# Patient Record
Sex: FEMALE | Race: WHITE | NOT HISPANIC OR LATINO | Employment: FULL TIME | URBAN - METROPOLITAN AREA
[De-identification: names, ages, dates, MRNs, and addresses within clinical notes are randomized per-mention and may not be internally consistent; named-entity substitution may affect disease eponyms.]

---

## 2017-07-12 ENCOUNTER — APPOINTMENT (OUTPATIENT)
Dept: WOMENS IMAGING | Facility: HOSPITAL | Age: 62
End: 2017-07-12

## 2017-07-12 PROCEDURE — 77067 SCR MAMMO BI INCL CAD: CPT | Performed by: RADIOLOGY

## 2017-07-12 PROCEDURE — 77063 BREAST TOMOSYNTHESIS BI: CPT | Performed by: RADIOLOGY

## 2017-08-14 ENCOUNTER — OFFICE VISIT (OUTPATIENT)
Dept: NEUROSURGERY | Facility: CLINIC | Age: 62
End: 2017-08-14

## 2017-08-14 VITALS
WEIGHT: 180 LBS | HEIGHT: 63 IN | TEMPERATURE: 97.4 F | DIASTOLIC BLOOD PRESSURE: 80 MMHG | SYSTOLIC BLOOD PRESSURE: 152 MMHG | BODY MASS INDEX: 31.89 KG/M2

## 2017-08-14 DIAGNOSIS — M51.36 DEGENERATIVE DISC DISEASE, LUMBAR: Primary | ICD-10-CM

## 2017-08-14 DIAGNOSIS — G89.29 CHRONIC PAIN OF LEFT KNEE: ICD-10-CM

## 2017-08-14 DIAGNOSIS — M25.562 CHRONIC PAIN OF LEFT KNEE: ICD-10-CM

## 2017-08-14 PROCEDURE — 99203 OFFICE O/P NEW LOW 30 MIN: CPT | Performed by: NEUROLOGICAL SURGERY

## 2017-08-14 RX ORDER — ATORVASTATIN CALCIUM 20 MG/1
20 TABLET, FILM COATED ORAL DAILY
COMMUNITY

## 2017-08-14 RX ORDER — IBUPROFEN 600 MG/1
600 TABLET ORAL EVERY 6 HOURS PRN
COMMUNITY

## 2017-08-14 RX ORDER — PHENOL 1.4 %
600 AEROSOL, SPRAY (ML) MUCOUS MEMBRANE DAILY
COMMUNITY

## 2017-08-14 RX ORDER — GABAPENTIN 300 MG/1
300 CAPSULE ORAL NIGHTLY
COMMUNITY

## 2017-08-14 RX ORDER — LOSARTAN POTASSIUM AND HYDROCHLOROTHIAZIDE 25; 100 MG/1; MG/1
1 TABLET ORAL DAILY
COMMUNITY

## 2017-08-14 RX ORDER — LEVOTHYROXINE SODIUM 88 UG/1
88 TABLET ORAL DAILY
COMMUNITY

## 2017-08-14 RX ORDER — PROMETHAZINE HYDROCHLORIDE 12.5 MG/1
12.5 TABLET ORAL EVERY 6 HOURS PRN
COMMUNITY

## 2017-08-14 RX ORDER — OMEPRAZOLE 20 MG/1
20 CAPSULE, DELAYED RELEASE ORAL DAILY
COMMUNITY

## 2017-08-14 RX ORDER — MONTELUKAST SODIUM 10 MG/1
10 TABLET ORAL NIGHTLY
COMMUNITY

## 2017-08-14 NOTE — PROGRESS NOTES
Amelia Patricio  1955  [id number]      Chief Complaint   Patient presents with   • Back Pain   • Leg Pain   • Numbness       HISTORY OF PRESENT ILLNESS:  This is a 62-year-old female who presents with a chief complaint of pain in her lumbosacral area associated with numbness in the left anterior thigh and subsequent below the knee as well as pain and swelling in her left knee which she attributes to an accident she sustained 1-19-17.  She went to physical therapy which helps somewhat yet she remains symptomatic.  She has continued to work.  I'll do with prolonged sitting and standing.  MRI of the lumbar spine and her left knee have been performed.  She is referred for neurosurgical consultation.     Past Medical History:   Diagnosis Date   • Arthritis    • DM (diabetes mellitus)    • Hernia    • Hypertension    • Thyroid disease        Past Surgical History:   Procedure Laterality Date   • BILATERAL BREAST REDUCTION     • TONSILLECTOMY         Family History   Problem Relation Age of Onset   • Cancer Mother    • Heart disease Father        Social History     Social History   • Marital status:      Spouse name: N/A   • Number of children: N/A   • Years of education: N/A     Occupational History   • Not on file.     Social History Main Topics   • Smoking status: Never Smoker   • Smokeless tobacco: Not on file   • Alcohol use No   • Drug use: No   • Sexual activity: Defer     Other Topics Concern   • Not on file     Social History Narrative   • No narrative on file       Allergies   Allergen Reactions   • Augmentin [Amoxicillin-Pot Clavulanate]          Current Outpatient Prescriptions:   •  atorvastatin (LIPITOR) 20 MG tablet, Take 20 mg by mouth Daily., Disp: , Rfl:   •  calcium carbonate (OS-TRUMAN) 600 MG tablet, Take 600 mg by mouth Daily., Disp: , Rfl:   •  gabapentin (NEURONTIN) 300 MG capsule, Take 300 mg by mouth Every Night., Disp: , Rfl:   •  ibuprofen (ADVIL,MOTRIN) 600 MG tablet, Take 600 mg by  mouth Every 6 (Six) Hours As Needed for Mild Pain (1-3)., Disp: , Rfl:   •  levothyroxine (SYNTHROID, LEVOTHROID) 88 MCG tablet, Take 88 mcg by mouth Daily., Disp: , Rfl:   •  losartan-hydrochlorothiazide (HYZAAR) 100-25 MG per tablet, Take 1 tablet by mouth Daily., Disp: , Rfl:   •  metFORMIN (GLUCOPHAGE) 500 MG tablet, Take 500 mg by mouth 2 (Two) Times a Day With Meals., Disp: , Rfl:   •  montelukast (SINGULAIR) 10 MG tablet, Take 10 mg by mouth Every Night., Disp: , Rfl:   •  omeprazole (priLOSEC) 20 MG capsule, Take 20 mg by mouth Daily., Disp: , Rfl:   •  promethazine (PHENERGAN) 12.5 MG tablet, Take 12.5 mg by mouth Every 6 (Six) Hours As Needed for Nausea or Vomiting., Disp: , Rfl:     Review of Systems   Constitutional: Positive for activity change, fatigue and unexpected weight change. Negative for appetite change, chills, diaphoresis and fever.   HENT: Negative for congestion, dental problem, drooling, ear discharge, ear pain, facial swelling, hearing loss, mouth sores, nosebleeds, postnasal drip, rhinorrhea, sinus pressure, sneezing, sore throat, tinnitus, trouble swallowing and voice change.    Eyes: Positive for pain, discharge, redness, itching and visual disturbance. Negative for photophobia.   Respiratory: Positive for apnea, cough and shortness of breath. Negative for choking, chest tightness, wheezing and stridor.    Cardiovascular: Positive for leg swelling. Negative for chest pain and palpitations.   Gastrointestinal: Negative for abdominal distention, abdominal pain, anal bleeding, blood in stool, constipation, diarrhea, nausea, rectal pain and vomiting.   Endocrine: Positive for heat intolerance. Negative for cold intolerance, polydipsia, polyphagia and polyuria.   Genitourinary: Positive for frequency. Negative for decreased urine volume, difficulty urinating, dysuria, enuresis, flank pain, genital sores, hematuria and urgency.   Musculoskeletal: Positive for arthralgias, back pain, joint  "swelling, myalgias, neck pain and neck stiffness. Negative for gait problem.   Skin: Positive for color change and rash. Negative for pallor and wound.   Allergic/Immunologic: Positive for environmental allergies. Negative for food allergies and immunocompromised state.   Neurological: Positive for tremors, weakness, light-headedness, numbness and headaches. Negative for dizziness, seizures, syncope, facial asymmetry and speech difficulty.   Hematological: Negative for adenopathy. Does not bruise/bleed easily.   Psychiatric/Behavioral: Positive for decreased concentration. Negative for agitation, behavioral problems, confusion, dysphoric mood, hallucinations, self-injury, sleep disturbance and suicidal ideas. The patient is nervous/anxious. The patient is not hyperactive.        Vitals:    08/14/17 0916   BP: 152/80   BP Location: Right arm   Patient Position: Sitting   Temp: 97.4 °F (36.3 °C)   TempSrc: Temporal Artery    Weight: 180 lb (81.6 kg)   Height: 63\" (160 cm)       Neurological Examination:  Mental status/speech: The patient is alert and oriented.  Speech is clear without aphysia or dysarthria.  No overt cognitive deficits.    Cranial nerve examination:    Olfaction: Smell is intact.  Vision: Vision is intact without visual field abnormalities.  Funduscopic examination is normal.  No pupillary irregularity.  Ocular motor examination: The extraocular muscles are intact.  There is no diplopia.  The pupil is round and reactive to both light and accommodation.  There is no nystagmus.  Facial movement/sensation: There is no facial weakness.  Sensation is intact in the first, second, and third divisions of the trigeminal nerve.  The corneal reflex is intact.  Auditory: Hearing is intact to finger rub bilaterally.  Cranial nerves IX, X, XI, XII: Phonation is normal.  No dysphagia.  Tongue is protruded in the midline without atrophy.  The gag reflex is intact.  Shoulder shrug is normal.    Musculoligamentous " ligamentous examination: She has decreased range of motion lumbar spine.  Straight leg raising and Maikol's test cause pain in her hip and posterior thigh.  Her strength is excellent without weakness or atrophy.  Sensation is intact.  The reflexes are symmetrically preserved.  There is no Babinski Rhoda or clonus.    She has swelling and tenderness in her left knee.    Medical Decision Making:     Diagnostic Data Set:  Lumbar MRI shows degenerative disc disease without focal herniation.  There is no evidence of high-grade spinal stenosis or lateral recess closure.  There is no evidence of spondylolisthesis or any acute bony injury.      Assessment:  Degenerative arthritis of the lumbar spine; rule out entrapment neuropathy, left leg.  Injury to left knee          Recommendations:  [The lumbar MRI indicates no evidence of an abnormality that would require surgical intervention.  Nevertheless further studies and consultations are justified.  She needs a EMG/NCV of the left lower extremity.  In addition I would like for her to see an orthopedist in reference to her knee. ]        I greatly appreciate the opportunity to see and evaluate this individual.  If you have questions or concerns regarding issues that I may have overlooked please call me at any time: [phone].  Yovanny Thomas M.D.  [facility]  [address] [place] [address]    Scribed for Shay Thomas MD by Britton Cardenas CMA. 8/14/2017  9:36 AM     I have read and concur with the information provided by the scribe.  Shay Thomas MD

## 2017-08-14 NOTE — PATIENT INSTRUCTIONS
Call Dr. Thomas on a Monday or Tuesday.   Ask for Erika,  and leave a message for  Dr. Thomas.  He will call you back at the end of the day as soon as he can.     [phone]

## 2017-08-31 DIAGNOSIS — M25.562 CHRONIC PAIN OF LEFT KNEE: Primary | ICD-10-CM

## 2017-08-31 DIAGNOSIS — G89.29 CHRONIC PAIN OF LEFT KNEE: Primary | ICD-10-CM

## 2017-09-25 ENCOUNTER — HOSPITAL ENCOUNTER (OUTPATIENT)
Dept: NEUROLOGY | Facility: HOSPITAL | Age: 62
Discharge: HOME | End: 2017-09-25
Attending: NEUROLOGICAL SURGERY | Admitting: NEUROLOGICAL SURGERY

## 2017-09-25 ENCOUNTER — OFFICE VISIT (OUTPATIENT)
Dept: NEUROSURGERY | Facility: CLINIC | Age: 62
End: 2017-09-25

## 2017-09-25 VITALS
HEIGHT: 63 IN | BODY MASS INDEX: 31.18 KG/M2 | WEIGHT: 176 LBS | TEMPERATURE: 97.1 F | DIASTOLIC BLOOD PRESSURE: 70 MMHG | SYSTOLIC BLOOD PRESSURE: 112 MMHG

## 2017-09-25 DIAGNOSIS — M51.36 DEGENERATIVE DISC DISEASE, LUMBAR: Primary | ICD-10-CM

## 2017-09-25 DIAGNOSIS — M48.061 SPINAL STENOSIS OF LUMBAR REGION: ICD-10-CM

## 2017-09-25 PROCEDURE — 95909 NRV CNDJ TST 5-6 STUDIES: CPT

## 2017-09-25 PROCEDURE — 99213 OFFICE O/P EST LOW 20 MIN: CPT | Performed by: NEUROLOGICAL SURGERY

## 2017-09-25 PROCEDURE — 95886 MUSC TEST DONE W/N TEST COMP: CPT

## 2017-09-25 NOTE — PROGRESS NOTES
Amelia Patricio  1955  [id number]                       CURRENT WORKING DIAGNOSIS:  [ Musculoligamentous strain]         MEDICAL HISTORY SINCE LAST ENCOUNTER:  [She reports for continued follow-up of pain in her left hip, coccygeal region rating into her left leg which she attributes to an accident which she was involved on 1/19/17.  Lumbar MRI is shown no evidence of herniation of intervertebral disc fracture and/or dislocation.  The EMG/NCV likewise is normal.  The studies have eliminated nerve root entrapment as providing and exploration.  She has severe pain in her left hip particularly when she rotates her leg suggestion issues with her hip.  She also has been told that she has is issues with her left knee; orthopedic consultation is pending. ]           Past Medical History:   Diagnosis Date   • Arthritis    • DM (diabetes mellitus)    • Hernia    • Hypertension    • Thyroid disease               Past Surgical History:   Procedure Laterality Date   • BILATERAL BREAST REDUCTION     • TONSILLECTOMY                Family History   Problem Relation Age of Onset   • Cancer Mother    • Heart disease Father               Social History     Social History   • Marital status:      Spouse name: N/A   • Number of children: N/A   • Years of education: N/A     Occupational History   • Not on file.     Social History Main Topics   • Smoking status: Never Smoker   • Smokeless tobacco: Never Used   • Alcohol use No   • Drug use: No   • Sexual activity: Defer     Other Topics Concern   • Not on file     Social History Narrative              Allergies   Allergen Reactions   • Augmentin [Amoxicillin-Pot Clavulanate]               Current Outpatient Prescriptions:   •  atorvastatin (LIPITOR) 20 MG tablet, Take 20 mg by mouth Daily., Disp: , Rfl:   •  calcium carbonate (OS-TRUMAN) 600 MG tablet, Take 600 mg by mouth Daily., Disp: , Rfl:   •  gabapentin (NEURONTIN) 300 MG capsule, Take 300 mg by mouth Every Night., Disp: ,  Rfl:   •  ibuprofen (ADVIL,MOTRIN) 600 MG tablet, Take 600 mg by mouth Every 6 (Six) Hours As Needed for Mild Pain (1-3)., Disp: , Rfl:   •  levothyroxine (SYNTHROID, LEVOTHROID) 88 MCG tablet, Take 88 mcg by mouth Daily., Disp: , Rfl:   •  losartan-hydrochlorothiazide (HYZAAR) 100-25 MG per tablet, Take 1 tablet by mouth Daily., Disp: , Rfl:   •  metFORMIN (GLUCOPHAGE) 500 MG tablet, Take 500 mg by mouth 2 (Two) Times a Day With Meals., Disp: , Rfl:   •  montelukast (SINGULAIR) 10 MG tablet, Take 10 mg by mouth Every Night., Disp: , Rfl:   •  omeprazole (priLOSEC) 20 MG capsule, Take 20 mg by mouth Daily., Disp: , Rfl:   •  promethazine (PHENERGAN) 12.5 MG tablet, Take 12.5 mg by mouth Every 6 (Six) Hours As Needed for Nausea or Vomiting., Disp: , Rfl:          Review of Systems   Constitutional: Negative for activity change, appetite change, chills, diaphoresis, fatigue, fever and unexpected weight change.   HENT: Negative for congestion, dental problem, drooling, ear discharge, ear pain, facial swelling, hearing loss, mouth sores, nosebleeds, postnasal drip, rhinorrhea, sinus pressure, sneezing, sore throat, tinnitus, trouble swallowing and voice change.    Eyes: Negative for photophobia, pain, discharge, redness, itching and visual disturbance.   Respiratory: Negative for apnea, cough, choking, chest tightness, shortness of breath, wheezing and stridor.    Cardiovascular: Negative for chest pain, palpitations and leg swelling.   Gastrointestinal: Negative for abdominal distention, abdominal pain, anal bleeding, blood in stool, constipation, diarrhea, nausea, rectal pain and vomiting.   Endocrine: Negative for cold intolerance, heat intolerance, polydipsia, polyphagia and polyuria.   Genitourinary: Negative for decreased urine volume, difficulty urinating, dysuria, enuresis, flank pain, frequency, genital sores, hematuria and urgency.   Musculoskeletal: Negative for arthralgias, back pain, gait problem, joint  "swelling, myalgias, neck pain and neck stiffness.   Skin: Negative for color change, pallor, rash and wound.   Allergic/Immunologic: Negative for environmental allergies, food allergies and immunocompromised state.   Neurological: Negative for dizziness, tremors, seizures, syncope, facial asymmetry, speech difficulty, weakness, light-headedness, numbness and headaches.   Hematological: Negative for adenopathy. Does not bruise/bleed easily.   Psychiatric/Behavioral: Negative for agitation, behavioral problems, confusion, decreased concentration, dysphoric mood, hallucinations, self-injury, sleep disturbance and suicidal ideas. The patient is not nervous/anxious and is not hyperactive.                Vitals:    09/25/17 1334   BP: 112/70   Temp: 97.1 °F (36.2 °C)   Weight: 176 lb (79.8 kg)   Height: 63\" (160 cm)               EXAMINATION: She has severe hip pain with internal/external rotation of her left hip.  Straight leg raising is negative.  I find no weakness, sensory loss or reflex asymmetry.  She is tender to palpation of the coccygeal region however.            MEDICAL DECISION MAKING: Muscular and ligamentous strain           ASSESSMENT/DISPOSITION: I have ordered a MRI of her left hip prior to the orthopedic evaluation.  Her examination would suggest she has issues centered in around the left pelvis.  I am unable to explain the symptoms that she has from nerve root entrapment or from her lumbar spine, however.  She will call me after her orthopedic encounter.              I APPRECIATE THE OPPORTUNITY OF THIS REFERRAL. PLEASE CALL IF ANY       QUESTIONS [phone]    "

## 2017-10-02 ENCOUNTER — OFFICE VISIT (OUTPATIENT)
Dept: ORTHOPEDIC SURGERY | Facility: CLINIC | Age: 62
End: 2017-10-02

## 2017-10-02 VITALS
HEIGHT: 63 IN | DIASTOLIC BLOOD PRESSURE: 91 MMHG | BODY MASS INDEX: 30.48 KG/M2 | HEART RATE: 73 BPM | WEIGHT: 172 LBS | SYSTOLIC BLOOD PRESSURE: 144 MMHG

## 2017-10-02 DIAGNOSIS — M79.652 LEFT THIGH PAIN: ICD-10-CM

## 2017-10-02 DIAGNOSIS — G89.29 CHRONIC PAIN OF LEFT KNEE: ICD-10-CM

## 2017-10-02 DIAGNOSIS — M25.562 CHRONIC PAIN OF LEFT KNEE: ICD-10-CM

## 2017-10-02 DIAGNOSIS — M25.552 LATERAL PAIN OF LEFT HIP: Primary | ICD-10-CM

## 2017-10-02 PROCEDURE — 99203 OFFICE O/P NEW LOW 30 MIN: CPT | Performed by: ORTHOPAEDIC SURGERY

## 2017-10-02 NOTE — PROGRESS NOTES
[facility] Note    Subjective     Chief Complaint   Patient presents with   • Left Hip - Pain   • Left Knee - Pain        HPI    Amelia Patricio is a 62 y.o. female. Patient presents today for evaluation of her left hip, left knee, and left thigh.  She fell at work on 1/19/2017, and had the onset of pain in these body parts afterwards.  She struck her knee at that time, and has had some increased swelling on the anterior aspect of the knee in the prepatellar bursal area afterwards.  She also has some lateral hip pain.  She was referred here for further care and treatment.  No imaging of the hip has been performed in the past, where she did have an old MRI on the knee prior to the onset of the prepatellar bursitis.  She also has some back pain, and is being followed by Dr. Thomas.  The pain is aching, burning, associated with swelling and popping, as well as stiffness and giving way.  The pain persists, and she's had no significant improvement with medications and physical therapy.      Patient Active Problem List   Diagnosis   • Degenerative disc disease, lumbar   • Chronic pain of left knee     Past Medical History:   Diagnosis Date   • Arthritis    • DM (diabetes mellitus)    • Hernia    • Hypertension    • Thyroid disease       Past Surgical History:   Procedure Laterality Date   • BILATERAL BREAST REDUCTION     • TONSILLECTOMY        Family History   Problem Relation Age of Onset   • Cancer Mother    • Hypertension Mother    • Heart disease Father    • Hypertension Father    • Heart attack Father      Social History     Social History   • Marital status:      Spouse name: N/A   • Number of children: N/A   • Years of education: N/A     Occupational History   • Not on file.     Social History Main Topics   • Smoking status: Never Smoker   • Smokeless tobacco: Never Used   • Alcohol use No   • Drug use: No   • Sexual activity: Defer     Other Topics Concern   • Not on file     Social  History Narrative      Current Outpatient Prescriptions on File Prior to Visit   Medication Sig Dispense Refill   • atorvastatin (LIPITOR) 20 MG tablet Take 20 mg by mouth Daily.     • calcium carbonate (OS-TRUMAN) 600 MG tablet Take 600 mg by mouth Daily.     • gabapentin (NEURONTIN) 300 MG capsule Take 300 mg by mouth Every Night.     • ibuprofen (ADVIL,MOTRIN) 600 MG tablet Take 600 mg by mouth Every 6 (Six) Hours As Needed for Mild Pain (1-3).     • levothyroxine (SYNTHROID, LEVOTHROID) 88 MCG tablet Take 88 mcg by mouth Daily.     • losartan-hydrochlorothiazide (HYZAAR) 100-25 MG per tablet Take 1 tablet by mouth Daily.     • metFORMIN (GLUCOPHAGE) 500 MG tablet Take 500 mg by mouth 2 (Two) Times a Day With Meals.     • montelukast (SINGULAIR) 10 MG tablet Take 10 mg by mouth Every Night.     • omeprazole (priLOSEC) 20 MG capsule Take 20 mg by mouth Daily.     • promethazine (PHENERGAN) 12.5 MG tablet Take 12.5 mg by mouth Every 6 (Six) Hours As Needed for Nausea or Vomiting.       No current facility-administered medications on file prior to visit.       Allergies   Allergen Reactions   • Augmentin [Amoxicillin-Pot Clavulanate]         Review of Systems   Constitutional: Positive for fatigue. Negative for activity change, appetite change, chills, diaphoresis, fever and unexpected weight change.   HENT: Positive for congestion and hearing loss. Negative for dental problem, drooling, ear discharge, ear pain, facial swelling, mouth sores, nosebleeds, postnasal drip, rhinorrhea, sinus pressure, sneezing, sore throat, tinnitus, trouble swallowing and voice change.    Eyes: Positive for redness and itching. Negative for photophobia, pain, discharge and visual disturbance.   Respiratory: Positive for cough. Negative for apnea, choking, chest tightness, shortness of breath, wheezing and stridor.    Cardiovascular: Negative for chest pain, palpitations and leg swelling.   Gastrointestinal: Negative for abdominal  "distention, abdominal pain, anal bleeding, blood in stool, constipation, diarrhea, nausea, rectal pain and vomiting.   Endocrine: Positive for heat intolerance. Negative for cold intolerance, polydipsia, polyphagia and polyuria.   Genitourinary: Negative for decreased urine volume, difficulty urinating, dysuria, enuresis, flank pain, frequency, genital sores, hematuria and urgency.   Musculoskeletal: Positive for arthralgias, back pain, joint swelling, neck pain and neck stiffness. Negative for gait problem and myalgias.   Skin: Positive for color change and rash. Negative for pallor and wound.   Allergic/Immunologic: Negative for environmental allergies, food allergies and immunocompromised state.   Neurological: Positive for weakness and numbness. Negative for dizziness, tremors, seizures, syncope, facial asymmetry, speech difficulty, light-headedness and headaches.   Hematological: Negative for adenopathy. Bruises/bleeds easily.   Psychiatric/Behavioral: Positive for decreased concentration. Negative for agitation, behavioral problems, confusion, dysphoric mood, hallucinations, self-injury, sleep disturbance and suicidal ideas. The patient is not nervous/anxious and is not hyperactive.         Objective      Physical Exam  /91  Pulse 73  Ht 62.5\" (158.8 cm)  Wt 172 lb (78 kg)  BMI 30.96 kg/m2    Body mass index is 30.96 kg/(m^2).    General:   Mental Status:  Alert   Appearance: Cooperative, in no acute distress   Build and Nutrition: Obese female   Orientation: Alert and oriented to person, place and time   Posture: Normal   Gait: Normal    Integument:   Left hip: No skin lesions, no rash, no ecchymosis    Neurologic:   Sensation:    Left foot: Intact to light touch on the dorsal and plantar aspect   Motor:  Left lower extremity: 5/5 quadriceps, hamstrings, ankle dorsiflexors, and ankle plantar flexors  Vascular:   Left lower extremity: 2+ dorsalis pedis pulse, prompt capillary refill    Lower " Extremity:   Left Hip:    Tenderness:  On the lateral aspect of the hip overlying the greater trochanter    Swelling:  None    Crepitus:  None    Atrophy:  None    Range of motion:  External Rotation: 30°       Internal Rotation: 30°       Flexion:  100°       Extension:  0°   Instability:  None  Deformities:  None  Functional testing: Negative Stinchfield    No leg length discrepancy  Integument:   Left knee: No skin lesions, no rash, no ecchymosis    Neurologic:   Sensation:    Left foot: Intact to light touch on the dorsal and plantar aspect   Motor:  Left lower extremity: 5/5 quadriceps, hamstrings, ankle dorsiflexors, and ankle plantar flexors  Vascular:   Left lower extremity: 2+ dorsalis pedis pulse, prompt capillary refill    Lower Extremities:   Left Knee:    Tenderness:  Over the medial and lateral joint lines, as well as over the enlarged prepatellar bursa    Effusion:  None    Swelling:  In the prepatellar bursa    Crepitus:  None    Atrophy:  None    Range of motion:  Extension: 0°       Flexion: 120°  Instability:  No varus laxity, no valgus laxity, negative anterior drawer  Deformities:  Enlargement of the prepatellar bursa      Imaging/Studies      Imaging Results (last 24 hours)     Procedure Component Value Units Date/Time    XR Hip With or Without Pelvis 1 View Left [[id number]] Resulted:  10/02/17 1252     Updated:  10/02/17 1253    Narrative:       Left Hip Radiographs  Indication: left hip pain  Views: low AP pelvis and lateral of the left hip    Comparison: no prior studies available for review    Findings:   Varus alignment is noted, with no acute bony abnormalities.  The femoral   head shows a small protuberance laterally, but joint space is maintained.    XR Knee 4+ View Left [[id number]] Resulted:  10/02/17 1253     Updated:  10/02/17 1253    Narrative:       Left Knee Radiographs  Indication: left knee pain  Views: Standing AP's and skiers of both knees, with lateral and sunrise   views of  the left knee    Comparison: no prior studies available    Findings:   Mild medial joint space narrowing is seen, with mild patellofemoral   degenerative changes, with no acute bony abnormalities.          Assessment and Plan     Amelia was seen today for pain and pain.    Diagnoses and all orders for this visit:    Lateral pain of left hip  -     XR Hip With or Without Pelvis 1 View Left  -     MRI Hip Left Without Contrast; Future    Chronic pain of left knee  -     XR Knee 4+ View Left  -     MRI Knee Left Without Contrast; Future    Left thigh pain  -     MRI Femur Left Without Contrast; Future        I reviewed my findings with patient today.  She complains of left hip, left posterior thigh, and left knee pain following an injury at work on 1/19/2017.  At this point, recommended further imaging of her hip, and I'm suspicious of tendinosis of the gluteus medius, and perhaps trochanteric bursitis.  She also has posterior thigh pain, and perhaps had a hamstring injury.  She also has swelling on the anterior aspect of her left knee, most likely secondary to a posttraumatic prepatellar bursitis.  I have recommended further imaging of these body parts with MRI.  I will see her back after those have been completed for review, but sooner for any problems.    Return for After Imaging Study, After Test(s) Completed.      Medical Decision Making  Data/Risk: radiology tests and independent visualization of imaging, lab tests, or EMG/NCV      Maciej Spears MD  10/02/17  1:48 PM

## 2017-11-01 ENCOUNTER — OFFICE VISIT (OUTPATIENT)
Dept: ORTHOPEDIC SURGERY | Facility: CLINIC | Age: 62
End: 2017-11-01

## 2017-11-01 VITALS
DIASTOLIC BLOOD PRESSURE: 78 MMHG | HEART RATE: 67 BPM | SYSTOLIC BLOOD PRESSURE: 150 MMHG | WEIGHT: 172 LBS | BODY MASS INDEX: 30.48 KG/M2 | HEIGHT: 63 IN

## 2017-11-01 DIAGNOSIS — M25.552 LATERAL PAIN OF LEFT HIP: Primary | ICD-10-CM

## 2017-11-01 DIAGNOSIS — G89.29 CHRONIC PAIN OF LEFT KNEE: ICD-10-CM

## 2017-11-01 DIAGNOSIS — M25.562 CHRONIC PAIN OF LEFT KNEE: ICD-10-CM

## 2017-11-01 DIAGNOSIS — M79.652 LEFT THIGH PAIN: ICD-10-CM

## 2017-11-01 PROCEDURE — 99213 OFFICE O/P EST LOW 20 MIN: CPT | Performed by: ORTHOPAEDIC SURGERY

## 2017-11-01 NOTE — PROGRESS NOTES
[facility]    Subjective     Chief Complaint   Patient presents with   • Left Knee - Follow-up     4 week follow up:  has not approved MRI- Wanting knee aspirated today   • Left Hip - Follow-up     4 week follow up:  has not approved MRI   • Left Leg - Follow-up     4 week follow up:  has not approved MRI        ILYA Patricio is a 62 y.o. female. Patient presents today for evaluation of her left hip, left knee, and left thigh.  She fell at work on 1/19/2017, when she fell over a child sofa and had the onset of pain in these body parts afterwards.  She struck her knee at that time, and has had some increased swelling on the anterior aspect of the knee in the prepatellar bursal area a few weeks afterwards.  She also has some lateral hip pain.  She was referred here for further care and treatment. The pain persists, and she's had no significant improvement with medications and physical therapy.    The MRIs were not approved, and she would like to have her knee prepatellar region aspirated today.      Patient Active Problem List   Diagnosis   • Degenerative disc disease, lumbar   • Chronic pain of left knee     Past Medical History:   Diagnosis Date   • Arthritis    • DM (diabetes mellitus)    • Hernia    • Hypertension    • Thyroid disease       Past Surgical History:   Procedure Laterality Date   • BILATERAL BREAST REDUCTION     • TONSILLECTOMY        Family History   Problem Relation Age of Onset   • Cancer Mother    • Hypertension Mother    • Heart disease Father    • Hypertension Father    • Heart attack Father      Social History     Social History   • Marital status:      Spouse name: N/A   • Number of children: N/A   • Years of education: N/A     Occupational History   • Not on file.     Social History Main Topics   • Smoking status: Never Smoker   • Smokeless tobacco: Never Used   • Alcohol use No   • Drug use: No   • Sexual activity: Defer     Other Topics  "Concern   • Not on file     Social History Narrative      Current Outpatient Prescriptions on File Prior to Visit   Medication Sig Dispense Refill   • atorvastatin (LIPITOR) 20 MG tablet Take 20 mg by mouth Daily.     • Bioflavonoid Products (EDITA C PO) Take  by mouth.     • calcium carbonate (OS-TRUMAN) 600 MG tablet Take 600 mg by mouth Daily.     • gabapentin (NEURONTIN) 300 MG capsule Take 300 mg by mouth Every Night.     • ibuprofen (ADVIL,MOTRIN) 600 MG tablet Take 600 mg by mouth Every 6 (Six) Hours As Needed for Mild Pain (1-3).     • levothyroxine (SYNTHROID, LEVOTHROID) 88 MCG tablet Take 88 mcg by mouth Daily.     • losartan-hydrochlorothiazide (HYZAAR) 100-25 MG per tablet Take 1 tablet by mouth Daily.     • metFORMIN (GLUCOPHAGE) 500 MG tablet Take 500 mg by mouth 2 (Two) Times a Day With Meals.     • montelukast (SINGULAIR) 10 MG tablet Take 10 mg by mouth Every Night.     • omeprazole (priLOSEC) 20 MG capsule Take 20 mg by mouth Daily.     • promethazine (PHENERGAN) 12.5 MG tablet Take 12.5 mg by mouth Every 6 (Six) Hours As Needed for Nausea or Vomiting.     • Unable to find 1 each 1 (One) Time. Med Name: Diabetes daily pack- dietary supplement       No current facility-administered medications on file prior to visit.       Allergies   Allergen Reactions   • Augmentin [Amoxicillin-Pot Clavulanate]         Review of Systems     Objective      Physical Exam  /78  Pulse 67  Ht 62.5\" (158.8 cm)  Wt 172 lb (78 kg)  BMI 30.96 kg/m2    Body mass index is 30.96 kg/(m^2).    General:   Mental Status:  Alert   Appearance: Cooperative, in no acute distress   Build and Nutrition: Well-nourished and well developed female   Orientation: Alert and oriented to person, place and time   Posture: Normal   Gait: Normal    .mek    Imaging/Studies  Imaging Results (last 24 hours)     ** No results found for the last 24 hours. **            Assessment and Plan     Amelia was seen today for follow-up, follow-up and " follow-up.    Diagnoses and all orders for this visit:    Lateral pain of left hip    Chronic pain of left knee    Left thigh pain        I reviewed my findings with patient today.  Unfortunately, she has no imaging to review, as the MRIs were not approved.  It is difficult for me to further guide her care without these imaging studies.  She would like for me to aspirate the prepatellar bursa, and I can do so as long as this is approved by [employer].  She denies any previous trauma to the knee, and that the swelling in the prepatellar bursal region showed some time after her injury.  I will be happy to aspirate the bursa, but I would prefer further imaging of her knee with MRI prior to doing so.      In order for me to further care for the patient, I have recommended the MRI of the hip and femur.  Whether this is obtained through [employer] or private insurance would be helpful for me either way.    Return for After Imaging Study.        Maciej Spears MD  11/01/17  4:44 PM

## 2018-04-26 ENCOUNTER — APPOINTMENT (OUTPATIENT)
Dept: WOMENS IMAGING | Facility: HOSPITAL | Age: 63
End: 2018-04-26

## 2018-04-26 PROCEDURE — 77062 BREAST TOMOSYNTHESIS BI: CPT | Performed by: RADIOLOGY

## 2018-04-26 PROCEDURE — 76641 ULTRASOUND BREAST COMPLETE: CPT | Performed by: RADIOLOGY

## 2018-04-26 PROCEDURE — 77066 DX MAMMO INCL CAD BI: CPT | Performed by: RADIOLOGY

## 2018-10-23 ENCOUNTER — APPOINTMENT (OUTPATIENT)
Dept: WOMENS IMAGING | Facility: HOSPITAL | Age: 63
End: 2018-10-23

## 2018-10-23 PROCEDURE — 76641 ULTRASOUND BREAST COMPLETE: CPT | Performed by: RADIOLOGY

## 2019-04-30 ENCOUNTER — APPOINTMENT (OUTPATIENT)
Dept: WOMENS IMAGING | Facility: HOSPITAL | Age: 64
End: 2019-04-30

## 2019-04-30 PROCEDURE — 77063 BREAST TOMOSYNTHESIS BI: CPT | Performed by: RADIOLOGY

## 2019-04-30 PROCEDURE — 76641 ULTRASOUND BREAST COMPLETE: CPT | Performed by: RADIOLOGY

## 2019-04-30 PROCEDURE — 77067 SCR MAMMO BI INCL CAD: CPT | Performed by: RADIOLOGY

## 2020-08-10 ENCOUNTER — APPOINTMENT (OUTPATIENT)
Dept: WOMENS IMAGING | Facility: HOSPITAL | Age: 65
End: 2020-08-10

## 2020-08-10 PROCEDURE — 77063 BREAST TOMOSYNTHESIS BI: CPT | Performed by: RADIOLOGY

## 2020-08-10 PROCEDURE — 77067 SCR MAMMO BI INCL CAD: CPT | Performed by: RADIOLOGY

## 2021-08-12 ENCOUNTER — APPOINTMENT (OUTPATIENT)
Dept: WOMENS IMAGING | Facility: HOSPITAL | Age: 66
End: 2021-08-12

## 2021-08-12 PROCEDURE — 77063 BREAST TOMOSYNTHESIS BI: CPT | Performed by: RADIOLOGY

## 2021-08-12 PROCEDURE — 77067 SCR MAMMO BI INCL CAD: CPT | Performed by: RADIOLOGY

## 2022-08-17 ENCOUNTER — APPOINTMENT (OUTPATIENT)
Dept: WOMENS IMAGING | Facility: HOSPITAL | Age: 67
End: 2022-08-17

## 2022-08-17 PROCEDURE — 77063 BREAST TOMOSYNTHESIS BI: CPT | Performed by: RADIOLOGY

## 2022-08-17 PROCEDURE — 77067 SCR MAMMO BI INCL CAD: CPT | Performed by: RADIOLOGY
